# Patient Record
Sex: FEMALE | Race: WHITE | Employment: UNEMPLOYED | URBAN - METROPOLITAN AREA
[De-identification: names, ages, dates, MRNs, and addresses within clinical notes are randomized per-mention and may not be internally consistent; named-entity substitution may affect disease eponyms.]

---

## 2018-04-10 ENCOUNTER — HOSPITAL ENCOUNTER (EMERGENCY)
Age: 28
Discharge: HOME | End: 2018-04-10
Attending: EMERGENCY MEDICINE
Payer: MEDICAID

## 2018-04-10 VITALS
OXYGEN SATURATION: 98 % | HEIGHT: 66 IN | DIASTOLIC BLOOD PRESSURE: 66 MMHG | TEMPERATURE: 98.6 F | HEART RATE: 66 BPM | WEIGHT: 175 LBS | BODY MASS INDEX: 28.12 KG/M2 | SYSTOLIC BLOOD PRESSURE: 108 MMHG | RESPIRATION RATE: 16 BRPM

## 2018-04-10 DIAGNOSIS — H04.321 DACRYOCYSTITIS, ACUTE, RIGHT: Primary | ICD-10-CM

## 2018-04-10 PROCEDURE — 99283 EMERGENCY DEPT VISIT LOW MDM: CPT

## 2018-04-10 PROCEDURE — 74011000250 HC RX REV CODE- 250: Performed by: EMERGENCY MEDICINE

## 2018-04-10 RX ORDER — AMOXICILLIN AND CLAVULANATE POTASSIUM 500; 125 MG/1; MG/1
1 TABLET, FILM COATED ORAL 2 TIMES DAILY
Qty: 14 TAB | Refills: 0 | Status: SHIPPED | OUTPATIENT
Start: 2018-04-10 | End: 2018-04-17

## 2018-04-10 RX ADMIN — FLUORESCEIN SODIUM 1 STRIP: 1 STRIP OPHTHALMIC at 12:38

## 2018-04-10 NOTE — DISCHARGE INSTRUCTIONS
Blocked Tear Duct in Children: Care Instructions  Your Care Instructions  Tears normally drain from the eye through small tubes called tear ducts, which stretch from the eye into the nose. In babies, a blocked tear duct occurs when these tubes get blocked or do not open properly. This can cause your child's eye to be teary and produce a yellowish white substance. If a tear duct remains blocked, the tear duct sac fills with fluid and may become swollen and inflamed. Sometimes it can get infected. In most cases, babies born with a blocked tear duct do not need treatment. The duct tends to open up on its own by 1 year of age. If the duct does not open, a procedure called probing can be used to open it. In the meantime, you can take care of your child at home by keeping the eye clean. This can help prevent infection. If the duct gets infected, your doctor will prescribe antibiotics. Follow-up care is a key part of your child's treatment and safety. Be sure to make and go to all appointments, and call your doctor if your child is having problems. It's also a good idea to know your child's test results and keep a list of the medicines your child takes. How can you care for your child at home? · Keep your child's eye clean:  ¨ Moisten a clean cotton ball or washcloth with warm (not hot) water, and gently wipe from the inner (near the nose) to the outer part of the eye. With each wipe, use a new or clean part of the cotton ball or washcloth. ¨ If your child's eyelashes are crusty with mucus, clean them with a moist cotton ball using a gentle, downward motion. If the eyelids get stuck together, place a clean, warm, wet cotton ball over that eye for a few minutes to help loosen the crust.  · Massage your child's tear duct. Press gently on the inner corner of the eye in a downward motion. Make sure that your hands are clean and your nails are short.   · If the doctor prescribed antibiotic pills, eyedrops, or ointment for your child, give them as directed. Do not stop using them just because your child's eye gets better. Your child needs to take the full course of antibiotics. · To put in eyedrops or ointment:  ¨ Tilt your child's head back, and pull the lower eyelid down with one finger. ¨ Drop or squirt the medicine inside the lower lid. ¨ Close your child's eye for 30 to 60 seconds to let the drops or ointment move around. ¨ Do not touch the ointment or dropper tip to the eyelashes or any other surface. When should you call for help? Call your doctor now or seek immediate medical care if:  ? · Your child has signs of infection, such as:  ¨ Increased swelling and redness in or around the eye, eyelid, or nose. ¨ Pus draining from the eye. ¨ A fever. ? Watch closely for changes in your child's health, and be sure to contact your doctor if:  ? · The drainage from your child's eye gets worse. ? · Your child's tear duct does not open up by the time he or she is 3year old. Where can you learn more? Go to http://riana-julia.info/. Enter Y109 in the search box to learn more about \"Blocked Tear Duct in Children: Care Instructions. \"  Current as of: May 12, 2017  Content Version: 11.4  © 5250-1451 [employer]. Care instructions adapted under license by [employer] (which disclaims liability or warranty for this information). If you have questions about a medical condition or this instruction, always ask your healthcare professional. [place] [address] any warranty or liability for your use of this information.

## 2018-04-10 NOTE — ED PROVIDER NOTES
HPI Comments: 24year old female hx asthma, PCOS presenting for eye discomfort. Pt reports that she started yesterday with mild discomfort of her right lower eyelid and also a slight foreign body sensation in the eye. Woke this AM with increased pain, swelling, and redness of the medial lower lid. No fever, chills, or other systemic symptoms. No crusting, discharge, or vision changes. No contact lens use. No treatment PTA. No other concerns. PMHx: as above  Social; non-smoker    Patient is a 24 y.o. female presenting with eye pain. The history is provided by the patient. Eye Pain    Associated symptoms include pain (eye lid). Pertinent negatives include no discharge, no photophobia, no eye redness, no vomiting and no fever. Past Medical History:   Diagnosis Date    Asthma     PCOS (polycystic ovarian syndrome)     Polycystic disease, ovaries        No past surgical history on file. No family history on file. Social History     Social History    Marital status:      Spouse name: N/A    Number of children: N/A    Years of education: N/A     Occupational History    Not on file. Social History Main Topics    Smoking status: Never Smoker    Smokeless tobacco: Not on file    Alcohol use Yes      Comment: SOCIAL DRINKER    Drug use: No    Sexual activity: Yes     Partners: Male     Birth control/ protection: None     Other Topics Concern    Not on file     Social History Narrative         ALLERGIES: Review of patient's allergies indicates no known allergies. Review of Systems   Constitutional: Negative for fever. HENT: Negative for congestion. Eyes: Positive for pain (eye lid). Negative for photophobia, discharge, redness, itching and visual disturbance. Respiratory: Negative for shortness of breath. Cardiovascular: Negative for chest pain. Gastrointestinal: Negative for vomiting. Musculoskeletal: Negative for neck stiffness. Skin: Negative for rash. All other systems reviewed and are negative. Vitals:    04/10/18 1150   BP: 108/66   Pulse: 66   Resp: 16   Temp: 98.6 °F (37 °C)   SpO2: 98%   Weight: 79.4 kg (175 lb)   Height: 5' 6\" (1.676 m)            Physical Exam   Constitutional: She is oriented to person, place, and time. She appears well-developed and well-nourished. No distress. Pleasant WF   HENT:   Head: Normocephalic and atraumatic. Right Ear: External ear normal.   Left Ear: External ear normal.   See photo  + erythema, edema, TTP at the medial canthus with no drainage noted  Small amount of erythema and TTP extending inferiorly  Normal, painless EOMs   Eyes: Conjunctivae are normal. Right conjunctiva is not injected. Left conjunctiva is not injected. No scleral icterus. Right eye exhibits normal extraocular motion. Left eye exhibits normal extraocular motion. Right pupil is round and reactive. Left pupil is round and reactive. Pupils are equal.   Neck: Neck supple. No tracheal deviation present. Cardiovascular: Normal rate, regular rhythm and normal heart sounds. Exam reveals no gallop and no friction rub. No murmur heard. Pulmonary/Chest: Effort normal and breath sounds normal. No stridor. No respiratory distress. She has no wheezes. Abdominal: Soft. She exhibits no distension. Musculoskeletal: Normal range of motion. Neurological: She is alert and oriented to person, place, and time. Skin: Skin is warm and dry. Psychiatric: She has a normal mood and affect. Her behavior is normal.   Nursing note and vitals reviewed. MDM  Number of Diagnoses or Management Options  Dacryocystitis, acute, right:   Diagnosis management comments: 24year old female presenting for right lower lid pain, swelling. Pt with swelling, erythema, TTP at the medial canthus with some extension inferiorly. No orbital swelling, TTP, proptosis, painful EOMs c/f orbital cellulitis. No systemic symptoms. No dye uptake. Normal visual acuity. Given mild redness of the surrounding skin, will treat with PO abx. Discussed return precautions and ophtho f/u.        Amount and/or Complexity of Data Reviewed  Discuss the patient with other providers: yes (Dr. Mcdonald Aase, ED attending)          ED Course       Procedures

## 2018-04-10 NOTE — ED TRIAGE NOTES
Pt reports left lower eye lip pain and swelling. Pt states she woke up yesterday morning with symptoms.

## 2018-10-12 ENCOUNTER — HOSPITAL ENCOUNTER (EMERGENCY)
Age: 28
Discharge: HOME | End: 2018-10-12
Attending: EMERGENCY MEDICINE
Payer: COMMERCIAL

## 2018-10-12 VITALS
TEMPERATURE: 98.2 F | DIASTOLIC BLOOD PRESSURE: 78 MMHG | BODY MASS INDEX: 28.42 KG/M2 | WEIGHT: 176.81 LBS | HEIGHT: 66 IN | SYSTOLIC BLOOD PRESSURE: 111 MMHG | RESPIRATION RATE: 18 BRPM | HEART RATE: 80 BPM | OXYGEN SATURATION: 100 %

## 2018-10-12 DIAGNOSIS — J20.9 ACUTE BRONCHITIS, UNSPECIFIED ORGANISM: Primary | ICD-10-CM

## 2018-10-12 DIAGNOSIS — H66.91 RIGHT OTITIS MEDIA, UNSPECIFIED OTITIS MEDIA TYPE: ICD-10-CM

## 2018-10-12 PROCEDURE — 99283 EMERGENCY DEPT VISIT LOW MDM: CPT

## 2018-10-12 RX ORDER — PREDNISONE 50 MG/1
50 TABLET ORAL DAILY
Qty: 5 TAB | Refills: 0 | Status: SHIPPED | OUTPATIENT
Start: 2018-10-12 | End: 2018-10-17

## 2018-10-12 RX ORDER — AMOXICILLIN 500 MG/1
500 TABLET, FILM COATED ORAL 2 TIMES DAILY
Qty: 14 TAB | Refills: 0 | Status: SHIPPED | OUTPATIENT
Start: 2018-10-12 | End: 2018-10-19

## 2018-10-12 RX ORDER — ALBUTEROL SULFATE 90 UG/1
2 AEROSOL, METERED RESPIRATORY (INHALATION)
Qty: 1 INHALER | Refills: 0 | Status: SHIPPED | OUTPATIENT
Start: 2018-10-12

## 2018-10-12 NOTE — ED PROVIDER NOTES
HPI Comments: 34year old female hx asthma presenting for URI symptoms; notes 5 days of nasal congestion, RIGHT ear pain, cough, possible wheezing, some SOB with cough. Denies CP, fever. Tmax 99.4.  + headaches. + sick contacts, multiple sick contacts at home with colds. + diarrhea. No vomiting or abdominal pain. No other concerns. PMHx: asthma, PCOS Social: non-smoker. + sickc ontats. Patient is a 34 y.o. female presenting with cough, ear pain, and headaches. The history is provided by the patient. Cough Associated symptoms include ear pain, headaches and wheezing. Pertinent negatives include no chest pain and no sore throat. Ear Pain Associated symptoms include headaches, diarrhea and cough. Pertinent negatives include no sore throat, no abdominal pain and no rash. Headache Pertinent negatives include no fever. Past Medical History:  
Diagnosis Date  Asthma  PCOS (polycystic ovarian syndrome)  Polycystic disease, ovaries History reviewed. No pertinent surgical history. History reviewed. No pertinent family history. Social History Social History  Marital status:  Spouse name: N/A  
 Number of children: N/A  
 Years of education: N/A Occupational History  Not on file. Social History Main Topics  Smoking status: Never Smoker  Smokeless tobacco: Not on file  Alcohol use Yes Comment: SOCIAL DRINKER  Drug use: No  
 Sexual activity: Yes  
  Partners: Male Birth control/ protection: None Other Topics Concern  Not on file Social History Narrative ALLERGIES: Review of patient's allergies indicates no known allergies. Review of Systems Constitutional: Negative for fever. HENT: Positive for congestion and ear pain. Negative for sore throat. Respiratory: Positive for cough and wheezing. Cardiovascular: Negative for chest pain and leg swelling. Gastrointestinal: Positive for diarrhea. Negative for abdominal pain. Genitourinary: Negative for dysuria. Musculoskeletal: Negative for neck stiffness. Skin: Negative for rash. Neurological: Positive for headaches. All other systems reviewed and are negative. Vitals:  
 10/12/18 1245 10/12/18 1349 BP: 112/76 Pulse: 90 Resp: 20 Temp: 98.4 °F (36.9 °C) SpO2: 98% 100% Weight: 80.2 kg (176 lb 12.9 oz) Height: 5' 6\" (1.676 m) Physical Exam  
Constitutional: She is oriented to person, place, and time. She appears well-developed and well-nourished. No distress. WF, non-toxic, pleasant and talkative HENT:  
Head: Normocephalic and atraumatic. Right Ear: External ear normal.  
Left Ear: External ear normal.  
RIGHT TM: erythematous, bulging Normal oropharynx + audible nasal congestion Eyes: Conjunctivae are normal. No scleral icterus. Neck: Neck supple. No tracheal deviation present. Cardiovascular: Normal rate, regular rhythm and normal heart sounds. Exam reveals no gallop and no friction rub. No murmur heard. Pulmonary/Chest: Effort normal and breath sounds normal. No stridor. No respiratory distress. She has no wheezes. Wheeze noted on forced expiration and with cough Abdominal: Soft. She exhibits no distension. There is no tenderness. Musculoskeletal: Normal range of motion. Lymphadenopathy:  
  She has no cervical adenopathy. Neurological: She is alert and oriented to person, place, and time. Skin: Skin is warm and dry. Psychiatric: She has a normal mood and affect. Her behavior is normal.  
Nursing note and vitals reviewed. MDM Number of Diagnoses or Management Options Diagnosis management comments: 34year old female presenting to the ED for URI symptoms, ear pain, cough, wheezing.  + hx asthma. Scant wheeze on exam, + right AOM. Will give Rx for MDI, pred burst, amox. Return precautions given. Amount and/or Complexity of Data Reviewed Discuss the patient with other providers: yes (Dr. Alexandra Foreman, ED attending) ED Course Procedures

## 2018-10-12 NOTE — ED TRIAGE NOTES
Triage Note: Patient is coming in with cough, right ear pain and left sided headache since Monday. Patient has been unable to sleep due to the coughing. Viral illnesses has been going around the family.

## 2018-10-12 NOTE — DISCHARGE INSTRUCTIONS
Bronchitis: Care Instructions  Your Care Instructions    Bronchitis is inflammation of the bronchial tubes, which carry air to the lungs. The tubes swell and produce mucus, or phlegm. The mucus and inflamed bronchial tubes make you cough. You may have trouble breathing. Most cases of bronchitis are caused by viruses like those that cause colds. Antibiotics usually do not help and they may be harmful. Bronchitis usually develops rapidly and lasts about 2 to 3 weeks in otherwise healthy people. Follow-up care is a key part of your treatment and safety. Be sure to make and go to all appointments, and call your doctor if you are having problems. It's also a good idea to know your test results and keep a list of the medicines you take. How can you care for yourself at home? · Take all medicines exactly as prescribed. Call your doctor if you think you are having a problem with your medicine. · Get some extra rest.  · Take an over-the-counter pain medicine, such as acetaminophen (Tylenol), ibuprofen (Advil, Motrin), or naproxen (Aleve) to reduce fever and relieve body aches. Read and follow all instructions on the label. · Do not take two or more pain medicines at the same time unless the doctor told you to. Many pain medicines have acetaminophen, which is Tylenol. Too much acetaminophen (Tylenol) can be harmful. · Take an over-the-counter cough medicine that contains dextromethorphan to help quiet a dry, hacking cough so that you can sleep. Avoid cough medicines that have more than one active ingredient. Read and follow all instructions on the label. · Breathe moist air from a humidifier, hot shower, or sink filled with hot water. The heat and moisture will thin mucus so you can cough it out. · Do not smoke. Smoking can make bronchitis worse. If you need help quitting, talk to your doctor about stop-smoking programs and medicines. These can increase your chances of quitting for good.   When should you call for help? Call 911 anytime you think you may need emergency care. For example, call if:    · You have severe trouble breathing.    Call your doctor now or seek immediate medical care if:    · You have new or worse trouble breathing.     · You cough up dark brown or bloody mucus (sputum).     · You have a new or higher fever.     · You have a new rash.    Watch closely for changes in your health, and be sure to contact your doctor if:    · You cough more deeply or more often, especially if you notice more mucus or a change in the color of your mucus.     · You are not getting better as expected. Where can you learn more? Go to http://riana-julia.info/. Enter H333 in the search box to learn more about \"Bronchitis: Care Instructions. \"  Current as of: December 6, 2017  Content Version: 11.8  © 4105-6071 [employer]. Care instructions adapted under license by [employer] (which disclaims liability or warranty for this information). If you have questions about a medical condition or this instruction, always ask your healthcare professional. [place] [address] any warranty or liability for your use of this information. Ear Infection (Otitis Media): Care Instructions  Your Care Instructions    An ear infection may start with a cold and affect the middle ear (otitis media). It can hurt a lot. Most ear infections clear up on their own in a couple of days. Most often you will not need antibiotics. This is because many ear infections are caused by a virus. Antibiotics don't work against a virus. Regular doses of pain medicines are the best way to reduce your fever and help you feel better. Follow-up care is a key part of your treatment and safety. Be sure to make and go to all appointments, and call your doctor if you are having problems. It's also a good idea to know your test results and keep a list of the medicines you take.   How can you care for yourself at home? · Take pain medicines exactly as directed. ¨ If the doctor gave you a prescription medicine for pain, take it as prescribed. ¨ If you are not taking a prescription pain medicine, take an over-the-counter medicine, such as acetaminophen (Tylenol), ibuprofen (Advil, Motrin), or naproxen (Aleve). Read and follow all instructions on the label. ¨ Do not take two or more pain medicines at the same time unless the doctor told you to. Many pain medicines have acetaminophen, which is Tylenol. Too much acetaminophen (Tylenol) can be harmful. · Plan to take a full dose of pain reliever before bedtime. Getting enough sleep will help you get better. · Try a warm, moist washcloth on the ear. It may help relieve pain. · If your doctor prescribed antibiotics, take them as directed. Do not stop taking them just because you feel better. You need to take the full course of antibiotics. When should you call for help? Call your doctor now or seek immediate medical care if:    · You have new or increasing ear pain.     · You have new or increasing pus or blood draining from your ear.     · You have a fever with a stiff neck or a severe headache.    Watch closely for changes in your health, and be sure to contact your doctor if:    · You have new or worse symptoms.     · You are not getting better after taking an antibiotic for 2 days. Where can you learn more? Go to http://riana-julia.info/. Enter F486 in the search box to learn more about \"Ear Infection (Otitis Media): Care Instructions. \"  Current as of: March 28, 2018  Content Version: 11.8  © 0539-8480 [employer]. Care instructions adapted under license by [employer] (which disclaims liability or warranty for this information).  If you have questions about a medical condition or this instruction, always ask your healthcare professional. [place] [address] any warranty or liability for your use of this information.

## 2018-10-28 ENCOUNTER — APPOINTMENT (OUTPATIENT)
Dept: GENERAL RADIOLOGY | Age: 28
End: 2018-10-28
Attending: PHYSICIAN ASSISTANT
Payer: COMMERCIAL

## 2018-10-28 ENCOUNTER — HOSPITAL ENCOUNTER (EMERGENCY)
Age: 28
Discharge: HOME | End: 2018-10-28
Attending: STUDENT IN AN ORGANIZED HEALTH CARE EDUCATION/TRAINING PROGRAM
Payer: COMMERCIAL

## 2018-10-28 VITALS
DIASTOLIC BLOOD PRESSURE: 70 MMHG | TEMPERATURE: 98.4 F | OXYGEN SATURATION: 98 % | WEIGHT: 176 LBS | HEIGHT: 66 IN | BODY MASS INDEX: 28.28 KG/M2 | HEART RATE: 80 BPM | SYSTOLIC BLOOD PRESSURE: 105 MMHG | RESPIRATION RATE: 16 BRPM

## 2018-10-28 DIAGNOSIS — J20.9 ACUTE BRONCHITIS, UNSPECIFIED ORGANISM: ICD-10-CM

## 2018-10-28 DIAGNOSIS — J06.9 ACUTE UPPER RESPIRATORY INFECTION: Primary | ICD-10-CM

## 2018-10-28 PROCEDURE — 99282 EMERGENCY DEPT VISIT SF MDM: CPT

## 2018-10-28 PROCEDURE — 71046 X-RAY EXAM CHEST 2 VIEWS: CPT

## 2018-10-28 RX ORDER — OXYMETAZOLINE HCL 0.05 %
2 SPRAY, NON-AEROSOL (ML) NASAL 2 TIMES DAILY
Qty: 1 EACH | Refills: 0 | Status: SHIPPED | OUTPATIENT
Start: 2018-10-28 | End: 2018-10-31

## 2018-10-28 RX ORDER — BENZONATATE 100 MG/1
100 CAPSULE ORAL
Qty: 20 CAP | Refills: 0 | Status: SHIPPED | OUTPATIENT
Start: 2018-10-28

## 2018-10-28 NOTE — ED PROVIDER NOTES
Ignacio Li is a 34 y.o. female with PMH of bronchitis and R AOM diagnosed 10/12/18 at [facility] ER, given prednisone, albuterol and amoxicillin which improved after a few days and per patient \"my symptoms came back Thursday night\" presents to emergency room ambulatory for evaluation of nasal congestion, rhinorrhea, cough with occasional productive yellow sputum x 3 days. She denies fever, chills, night sweats. Hx of PNA, but states her current sx's do not feel like when she had PNA in the past. Her daugther, and others at the home are currently having URI sx's as well. She denies SOB, states mild discomfort in chest only with coughing, no vomiting, diarrhea, hemoptysis, leg swelling, recent travel/surgery or immobilization and no hx of PE or leg edema. Taking Nyquil, Dayquil and Sudafed. No topical nasal sprays. Has not been wheezing, has not needed albuterol per patient. LMP- unsure, has mirena IUD 
 
PCP: Amrit Bardales MD 
 
Surgical hx- see chart Social hx- no tobacco 
 
The patient has no other complaints at this time. Past Medical History:  
Diagnosis Date  Asthma  PCOS (polycystic ovarian syndrome)  Polycystic disease, ovaries History reviewed. No pertinent surgical history. History reviewed. No pertinent family history. Social History Socioeconomic History  Marital status:  Spouse name: Not on file  Number of children: Not on file  Years of education: Not on file  Highest education level: Not on file Social Needs  Financial resource strain: Not on file  Food insecurity - worry: Not on file  Food insecurity - inability: Not on file  Transportation needs - medical: Not on file  Transportation needs - non-medical: Not on file Occupational History  Not on file Tobacco Use  Smoking status: Never Smoker Substance and Sexual Activity  Alcohol use: Yes Comment: SOCIAL DRINKER  Drug use:  No  
  Sexual activity: Yes  
  Partners: Male Birth control/protection: None Other Topics Concern  Not on file Social History Narrative  Not on file ALLERGIES: Patient has no known allergies. Review of Systems Constitutional: Negative. Negative for activity change, chills, fatigue and unexpected weight change. HENT: Positive for congestion. Negative for ear discharge, ear pain, facial swelling, hearing loss, nosebleeds, postnasal drip, rhinorrhea, sinus pressure, sneezing, sore throat, trouble swallowing and voice change. Respiratory: Positive for cough. Negative for chest tightness, shortness of breath and wheezing. Cardiovascular: Negative. Negative for chest pain and palpitations. Gastrointestinal: Negative. Negative for abdominal pain, diarrhea, nausea and vomiting. Genitourinary: Negative. Negative for dysuria, flank pain, frequency and hematuria. Musculoskeletal: Negative. Negative for arthralgias, back pain, neck pain and neck stiffness. Skin: Negative. Negative for color change and rash. Neurological: Negative. Negative for dizziness, numbness and headaches. Psychiatric/Behavioral: Negative. Negative for confusion. All other systems reviewed and are negative. Vitals:  
 10/28/18 1342 BP: 127/75 Pulse: 86 Resp: 16 Temp: 98.2 °F (36.8 °C) SpO2: 98% Weight: 79.8 kg (176 lb) Height: 5' 6\" (1.676 m) Physical Exam  
Constitutional: She is oriented to person, place, and time. She appears well-developed and well-nourished. She is active. Non-toxic appearance. No distress. HENT:  
Head: Normocephalic and atraumatic. Right Ear: External ear normal.  
Left Ear: External ear normal.  
Mouth/Throat: Oropharynx is clear and moist.  
+ rhinorrhea, + nasal congestion Eyes: Conjunctivae are normal. Pupils are equal, round, and reactive to light. Right eye exhibits no discharge. Left eye exhibits no discharge. Neck: Normal range of motion and full passive range of motion without pain. Neck supple. No tracheal tenderness present. Cardiovascular: Normal rate, regular rhythm, normal heart sounds, intact distal pulses and normal pulses. Exam reveals no gallop and no friction rub. No murmur heard. Pulmonary/Chest: Effort normal and breath sounds normal. No stridor. No respiratory distress. She has no wheezes. She has no rales. She exhibits no tenderness. Normal aeration throughout; no wheezing Abdominal: Soft. Bowel sounds are normal. She exhibits no distension. There is no tenderness. There is no rebound and no guarding. Musculoskeletal: Normal range of motion. She exhibits no edema or tenderness. Neurological: She is alert and oriented to person, place, and time. She has normal strength. No cranial nerve deficit or sensory deficit. Coordination normal.  
Skin: Skin is warm, dry and intact. Capillary refill takes less than 2 seconds. No abrasion and no rash noted. She is not diaphoretic. No erythema. No leg swelling Psychiatric: She has a normal mood and affect. Her speech is normal and behavior is normal. Cognition and memory are normal.  
Nursing note and vitals reviewed. MDM Number of Diagnoses or Management Options Acute upper respiratory infection:  
Diagnosis management comments:  
Ddx; bronchitis, URI, viral syndrome Amount and/or Complexity of Data Reviewed Tests in the radiology section of CPT®: ordered and reviewed Review and summarize past medical records: yes Patient Progress Patient progress: stable Procedures PERC score negative. Colby Taylor PA-C 
 
DISCHARGE NOTE: 
2:25 PM 
The patient's results have been reviewed with them and/or available family.  Patient and/or family verbally conveyed their understanding and agreement of the patient's signs, symptoms, diagnosis, treatment and prognosis and additionally agree to follow up as recommended in the discharge instructions or to return to the Emergency Room should their condition change prior to their follow-up appointment. The patient/family verbally agrees with the care-plan and verbally conveys that all of their questions have been answered. The discharge instructions have also been provided to the patient and/or family with some educational information regarding the patient's diagnosis as well a list of reasons why the patient would want to return to the ER prior to their follow-up appointment, should their condition change. Plan: 
1. F/U with PCp 2. Rx afrin 3. Continue albuterol PRN and other OTC symptomatic treatment Return precautions discussed and advised to return to ER if worse

## 2018-10-28 NOTE — ED TRIAGE NOTES
Pt here 2 weeks ago and dx with bronchitis. Reports onset of productive cough and congestion on Friday. Denies fevers. Pt's daughter has cold and ear infection.

## 2018-10-28 NOTE — DISCHARGE INSTRUCTIONS
Upper Respiratory Infection (Cold): Care Instructions  Your Care Instructions    An upper respiratory infection, or URI, is an infection of the nose, sinuses, or throat. URIs are spread by coughs, sneezes, and direct contact. The common cold is the most frequent kind of URI. The flu and sinus infections are other kinds of URIs. Almost all URIs are caused by viruses. Antibiotics won't cure them. But you can treat most infections with home care. This may include drinking lots of fluids and taking over-the-counter pain medicine. You will probably feel better in 4 to 10 days. The doctor has checked you carefully, but problems can develop later. If you notice any problems or new symptoms, get medical treatment right away. Follow-up care is a key part of your treatment and safety. Be sure to make and go to all appointments, and call your doctor if you are having problems. It's also a good idea to know your test results and keep a list of the medicines you take. How can you care for yourself at home? · To prevent dehydration, drink plenty of fluids, enough so that your urine is light yellow or clear like water. Choose water and other caffeine-free clear liquids until you feel better. If you have kidney, heart, or liver disease and have to limit fluids, talk with your doctor before you increase the amount of fluids you drink. · Take an over-the-counter pain medicine, such as acetaminophen (Tylenol), ibuprofen (Advil, Motrin), or naproxen (Aleve). Read and follow all instructions on the label. · Before you use cough and cold medicines, check the label. These medicines may not be safe for young children or for people with certain health problems. · Be careful when taking over-the-counter cold or flu medicines and Tylenol at the same time. Many of these medicines have acetaminophen, which is Tylenol. Read the labels to make sure that you are not taking more than the recommended dose.  Too much acetaminophen (Tylenol) can be harmful. · Get plenty of rest.  · Do not smoke or allow others to smoke around you. If you need help quitting, talk to your doctor about stop-smoking programs and medicines. These can increase your chances of quitting for good. When should you call for help? Call 911 anytime you think you may need emergency care. For example, call if:    · You have severe trouble breathing.    Call your doctor now or seek immediate medical care if:    · You seem to be getting much sicker.     · You have new or worse trouble breathing.     · You have a new or higher fever.     · You have a new rash.    Watch closely for changes in your health, and be sure to contact your doctor if:    · You have a new symptom, such as a sore throat, an earache, or sinus pain.     · You cough more deeply or more often, especially if you notice more mucus or a change in the color of your mucus.     · You do not get better as expected. Where can you learn more? Go to http://riana-julia.info/. Enter C447 in the search box to learn more about \"Upper Respiratory Infection (Cold): Care Instructions. \"  Current as of: December 6, 2017  Content Version: 11.8  © 7981-0263 [employer]. Care instructions adapted under license by [employer] (which disclaims liability or warranty for this information). If you have questions about a medical condition or this instruction, always ask your healthcare professional. [place] [address] any warranty or liability for your use of this information. Saline Nasal Washes: Care Instructions  Your Care Instructions  Saline nasal washes help keep the nasal passages open by washing out thick or dried mucus. This simple remedy can help relieve symptoms of allergies, sinusitis, and colds.  It also can make the nose feel more comfortable by keeping the mucous membranes moist. You may notice a little burning sensation in your nose the first few times you use the solution, but this usually gets better in a few days. Follow-up care is a key part of your treatment and safety. Be sure to make and go to all appointments, and call your doctor if you are having problems. It's also a good idea to know your test results and keep a list of the medicines you take. How can you care for yourself at home? · You can buy premixed saline solution in a squeeze bottle or other sinus rinse products at a drugstore. Read and follow the instructions on the label. · You also can make your own saline solution by adding 1 teaspoon of salt and 1 teaspoon of baking soda to 2 cups of distilled water. · If you use a homemade solution, pour a small amount into a clean bowl. Using a rubber bulb syringe, squeeze the syringe and place the tip in the salt water. Pull a small amount of the salt water into the syringe by relaxing your hand. · Sit down with your head tilted slightly back. Do not lie down. Put the tip of the bulb syringe or the squeeze bottle a little way into one of your nostrils. Gently drip or squirt a few drops into the nostril. Repeat with the other nostril. Some sneezing and gagging are normal at first.  · Gently blow your nose. · Wipe the syringe or bottle tip clean after each use. · Repeat this 2 or 3 times a day. · Use nasal washes gently if you have nosebleeds often. When should you call for help? Watch closely for changes in your health, and be sure to contact your doctor if:    · You often get nosebleeds.     · You have problems doing the nasal washes. Where can you learn more? Go to http://riana-julia.info/. Enter [id number] [id number] in the search box to learn more about \"Saline Nasal Washes: Care Instructions. \"  Current as of: March 28, 2018  Content Version: 11.8  © 3787-3492 [employer]. Care instructions adapted under license by [employer] (which disclaims liability or warranty for this information).  If you have questions about a medical condition or this instruction, always ask your healthcare professional. [address] any warranty or liability for your use of this information. Bronchitis: Care Instructions  Your Care Instructions    Bronchitis is inflammation of the bronchial tubes, which carry air to the lungs. The tubes swell and produce mucus, or phlegm. The mucus and inflamed bronchial tubes make you cough. You may have trouble breathing. Most cases of bronchitis are caused by viruses like those that cause colds. Antibiotics usually do not help and they may be harmful. Bronchitis usually develops rapidly and lasts about 2 to 3 weeks in otherwise healthy people. Follow-up care is a key part of your treatment and safety. Be sure to make and go to all appointments, and call your doctor if you are having problems. It's also a good idea to know your test results and keep a list of the medicines you take. How can you care for yourself at home? · Take all medicines exactly as prescribed. Call your doctor if you think you are having a problem with your medicine. · Get some extra rest.  · Take an over-the-counter pain medicine, such as acetaminophen (Tylenol), ibuprofen (Advil, Motrin), or naproxen (Aleve) to reduce fever and relieve body aches. Read and follow all instructions on the label. · Do not take two or more pain medicines at the same time unless the doctor told you to. Many pain medicines have acetaminophen, which is Tylenol. Too much acetaminophen (Tylenol) can be harmful. · Take an over-the-counter cough medicine that contains dextromethorphan to help quiet a dry, hacking cough so that you can sleep. Avoid cough medicines that have more than one active ingredient. Read and follow all instructions on the label. · Breathe moist air from a humidifier, hot shower, or sink filled with hot water. The heat and moisture will thin mucus so you can cough it out. · Do not smoke. Smoking can make bronchitis worse. If you need help quitting, talk to your doctor about stop-smoking programs and medicines. These can increase your chances of quitting for good. When should you call for help? Call 911 anytime you think you may need emergency care. For example, call if:    · You have severe trouble breathing.    Call your doctor now or seek immediate medical care if:    · You have new or worse trouble breathing.     · You cough up dark brown or bloody mucus (sputum).     · You have a new or higher fever.     · You have a new rash.    Watch closely for changes in your health, and be sure to contact your doctor if:    · You cough more deeply or more often, especially if you notice more mucus or a change in the color of your mucus.     · You are not getting better as expected. Where can you learn more? Go to http://riana-julia.info/. Enter H333 in the search box to learn more about \"Bronchitis: Care Instructions. \"  Current as of: December 6, 2017  Content Version: 11.8  © 1799-7562 [employer]. Care instructions adapted under license by [employer] (which disclaims liability or warranty for this information). If you have questions about a medical condition or this instruction, always ask your healthcare professional. [address] any warranty or liability for your use of this information.